# Patient Record
Sex: MALE | Race: WHITE | NOT HISPANIC OR LATINO | Employment: FULL TIME | URBAN - METROPOLITAN AREA
[De-identification: names, ages, dates, MRNs, and addresses within clinical notes are randomized per-mention and may not be internally consistent; named-entity substitution may affect disease eponyms.]

---

## 2023-04-22 LAB
ALANINE AMINOTRANSFERASE (SGPT) (U/L) IN SER/PLAS: 19 U/L (ref 10–52)
ALBUMIN (G/DL) IN SER/PLAS: 4.2 G/DL (ref 3.4–5)
ALKALINE PHOSPHATASE (U/L) IN SER/PLAS: 66 U/L (ref 33–136)
ANION GAP IN SER/PLAS: 14 MMOL/L (ref 10–20)
ASPARTATE AMINOTRANSFERASE (SGOT) (U/L) IN SER/PLAS: 16 U/L (ref 9–39)
BILIRUBIN TOTAL (MG/DL) IN SER/PLAS: 0.7 MG/DL (ref 0–1.2)
CALCIUM (MG/DL) IN SER/PLAS: 9 MG/DL (ref 8.6–10.3)
CARBON DIOXIDE, TOTAL (MMOL/L) IN SER/PLAS: 29 MMOL/L (ref 21–32)
CHLORIDE (MMOL/L) IN SER/PLAS: 102 MMOL/L (ref 98–107)
CHOLESTEROL (MG/DL) IN SER/PLAS: 227 MG/DL (ref 0–199)
CHOLESTEROL IN HDL (MG/DL) IN SER/PLAS: 46.5 MG/DL
CHOLESTEROL/HDL RATIO: 4.9
CREATININE (MG/DL) IN SER/PLAS: 1.14 MG/DL (ref 0.5–1.3)
ERYTHROCYTE DISTRIBUTION WIDTH (RATIO) BY AUTOMATED COUNT: 13 % (ref 11.5–14.5)
ERYTHROCYTE MEAN CORPUSCULAR HEMOGLOBIN CONCENTRATION (G/DL) BY AUTOMATED: 33.4 G/DL (ref 32–36)
ERYTHROCYTE MEAN CORPUSCULAR VOLUME (FL) BY AUTOMATED COUNT: 93 FL (ref 80–100)
ERYTHROCYTES (10*6/UL) IN BLOOD BY AUTOMATED COUNT: 5.11 X10E12/L (ref 4.5–5.9)
ESTIMATED AVERAGE GLUCOSE FOR HBA1C: 120 MG/DL
GFR MALE: 71 ML/MIN/1.73M2
GLUCOSE (MG/DL) IN SER/PLAS: 89 MG/DL (ref 74–99)
HEMATOCRIT (%) IN BLOOD BY AUTOMATED COUNT: 47.3 % (ref 41–52)
HEMOGLOBIN (G/DL) IN BLOOD: 15.8 G/DL (ref 13.5–17.5)
HEMOGLOBIN A1C/HEMOGLOBIN TOTAL IN BLOOD: 5.8 %
LDL: 147 MG/DL (ref 0–99)
LEUKOCYTES (10*3/UL) IN BLOOD BY AUTOMATED COUNT: 7.6 X10E9/L (ref 4.4–11.3)
PLATELETS (10*3/UL) IN BLOOD AUTOMATED COUNT: 174 X10E9/L (ref 150–450)
POTASSIUM (MMOL/L) IN SER/PLAS: 3.5 MMOL/L (ref 3.5–5.3)
PROTEIN TOTAL: 7.4 G/DL (ref 6.4–8.2)
SODIUM (MMOL/L) IN SER/PLAS: 141 MMOL/L (ref 136–145)
THYROTROPIN (MIU/L) IN SER/PLAS BY DETECTION LIMIT <= 0.05 MIU/L: 1.71 MIU/L (ref 0.44–3.98)
TRIGLYCERIDE (MG/DL) IN SER/PLAS: 166 MG/DL (ref 0–149)
UREA NITROGEN (MG/DL) IN SER/PLAS: 27 MG/DL (ref 6–23)
VLDL: 33 MG/DL (ref 0–40)

## 2023-04-23 LAB — PROSTATE SPECIFIC AG (NG/ML) IN SER/PLAS: 4.64 NG/ML (ref 0–4)

## 2024-02-05 ENCOUNTER — TELEPHONE (OUTPATIENT)
Dept: OTOLARYNGOLOGY | Facility: CLINIC | Age: 69
End: 2024-02-05
Payer: MEDICARE

## 2024-02-05 DIAGNOSIS — J32.9 CHRONIC SINUSITIS, UNSPECIFIED LOCATION: Primary | ICD-10-CM

## 2024-02-05 NOTE — TELEPHONE ENCOUNTER
Pt last seen in 4/2023.  He said his polyps are acting up and would like steroids called in.  He is in [place] .

## 2024-02-06 RX ORDER — PREDNISONE 10 MG/1
TABLET ORAL
Qty: 39 TABLET | Refills: 0 | Status: SHIPPED | OUTPATIENT
Start: 2024-02-06 | End: 2024-02-18

## 2024-03-23 DIAGNOSIS — I10 HYPERTENSION, UNSPECIFIED TYPE: Primary | ICD-10-CM

## 2024-03-25 RX ORDER — OLMESARTAN MEDOXOMIL, AMLODIPINE AND HYDROCHLOROTHIAZIDE TABLET 40/10/25 MG 40; 10; 25 MG/1; MG/1; MG/1
1 TABLET ORAL DAILY
Qty: 90 TABLET | Refills: 1 | Status: SHIPPED | OUTPATIENT
Start: 2024-03-25

## 2024-04-22 ENCOUNTER — OFFICE VISIT (OUTPATIENT)
Dept: OTOLARYNGOLOGY | Facility: CLINIC | Age: 69
End: 2024-04-22
Payer: MEDICARE

## 2024-04-22 VITALS — BODY MASS INDEX: 29.86 KG/M2 | WEIGHT: 197 LBS | HEIGHT: 68 IN

## 2024-04-22 DIAGNOSIS — J33.9 NASAL POLYPS: ICD-10-CM

## 2024-04-22 DIAGNOSIS — J32.9 CHRONIC SINUSITIS, UNSPECIFIED LOCATION: Primary | ICD-10-CM

## 2024-04-22 DIAGNOSIS — B99.9 INFECTION: ICD-10-CM

## 2024-04-22 PROCEDURE — 99214 OFFICE O/P EST MOD 30 MIN: CPT | Performed by: OTOLARYNGOLOGY

## 2024-04-22 PROCEDURE — 1160F RVW MEDS BY RX/DR IN RCRD: CPT | Performed by: OTOLARYNGOLOGY

## 2024-04-22 PROCEDURE — 1159F MED LIST DOCD IN RCRD: CPT | Performed by: OTOLARYNGOLOGY

## 2024-04-22 PROCEDURE — 1036F TOBACCO NON-USER: CPT | Performed by: OTOLARYNGOLOGY

## 2024-04-22 RX ORDER — BUDESONIDE AND FORMOTEROL FUMARATE DIHYDRATE 160; 4.5 UG/1; UG/1
2 AEROSOL RESPIRATORY (INHALATION)
COMMUNITY

## 2024-04-22 RX ORDER — PREDNISONE 5 MG/1
TABLET ORAL
Qty: 21 TABLET | Refills: 1 | Status: SHIPPED | OUTPATIENT
Start: 2024-04-22

## 2024-04-22 RX ORDER — FLUTICASONE PROPIONATE 50 MCG
1 SPRAY, SUSPENSION (ML) NASAL DAILY
COMMUNITY

## 2024-04-22 NOTE — PROGRESS NOTES
"HPI  Lemuel Aleman is a 69 y.o. male has required 1 course of steroids in the last year since last seen.  Using nasal steroid spray regularly.  Feeling well.  Has some congestion right side when breathing out but not when breathing in.       Prior history: 66-year-old male with history of bilateral endoscopic sinus surgery in mid 2019 for significant nasal polyposis. He has been using topical nasal steroids since that time. He has developed increasing left greater than right nasal obstruction and took 3 days of prednisone few weeks ago with some improvement and then discontinued this course. He has been reasonably stably breathing through his nose bilaterally since then but presents for follow-up to see what more can or should be done. He has not required antibiotics. He does not complain of vision changes or pain       Past Medical History:   Diagnosis Date    Personal history of other diseases of the circulatory system     History of hypertension    Personal history of other diseases of the respiratory system     History of nasal polyp            Medications:     Current Outpatient Medications:     budesonide-formoteroL (Symbicort) 160-4.5 mcg/actuation inhaler, Inhale 2 puffs 2 times a day. Rinse mouth with water after use to reduce aftertaste and incidence of candidiasis. Do not swallow., Disp: , Rfl:     fluticasone (Flonase) 50 mcg/actuation nasal spray, Administer 1 spray into each nostril once daily. Shake gently. Before first use, prime pump. After use, clean tip and replace cap., Disp: , Rfl:     olmesartan-amLODIPin-hcthiazid 40-10-25 mg tablet, TAKE 1 TABLET BY MOUTH EVERY DAY, Disp: 90 tablet, Rfl: 1     Allergies:  Allergies   Allergen Reactions    Advil [Ibuprofen] Other        Physical Exam:  Last Recorded Vitals  Height 1.727 m (5' 8\"), weight 89.4 kg (197 lb).  General:     General appearance: Well-developed, well-nourished in no acute distress.       Voice:  normal       Head/face: Normal " appearance; nontender to palpation     Facial nerve function: Normal and symmetric bilaterally.    Oral/oropharynx:     Oral vestibule: Normal labial and gingival mucosa     Tongue/floor of mouth: Normal without lesion     Oropharynx: Clear.  No lesions present of the hard/soft palate, posterior pharynx    Neck:     Neck: Normal appearance, trachea midline     Salivary glands: Normal to palpation bilaterally     Lymph nodes: No cervical lymphadenopathy to palpation     Thyroid: No thyromegaly.  No palpable nodules     Range of motion: Normal    Neurological:     Cortical functions: Alert and oriented x3, appropriate affect       Larynx/hypopharynx:     Laryngeal findings: Mirror exam inadequate or limited secondary to enlarged base of tongue and/or excessive gagging    Ear:     Ear canal: Normal bilaterally     Tympanic membrane: Intact and mobile bilaterally     Pinna: Normal bilaterally     Hearing:  Gross hearing assessment normal by voice    Nose:     Visualized using: Anterior rhinoscopy     Nasopharynx: Inadequate mirror exam secondary to gag, anatomy.       Nasal dorsum: Nontraumatic midline appearance     Septum: Midline     Inferior turbinates: Normally sized     Mucosa: Polyps to the inferior turbinate on the right and to the middle turbinate on the left more posteriorly      ASSESSMENT/PLAN:  Given his appearance and symptoms, recommend a short prednisone taper today and he has 1 refill to use as needed.  He has been happy with intermittent treatment which has not been more than a couple times a year.  Continue Flonase.  Recheck in 1 year, sooner as needed        Mehran Sorensen MD

## 2024-05-03 ENCOUNTER — HOSPITAL ENCOUNTER (EMERGENCY)
Facility: HOSPITAL | Age: 69
Discharge: HOME | End: 2024-05-03
Payer: MEDICARE

## 2024-05-03 ENCOUNTER — APPOINTMENT (OUTPATIENT)
Dept: RADIOLOGY | Facility: HOSPITAL | Age: 69
End: 2024-05-03
Payer: MEDICARE

## 2024-05-03 VITALS
BODY MASS INDEX: 28.88 KG/M2 | DIASTOLIC BLOOD PRESSURE: 81 MMHG | HEIGHT: 69 IN | SYSTOLIC BLOOD PRESSURE: 131 MMHG | OXYGEN SATURATION: 99 % | TEMPERATURE: 98.2 F | RESPIRATION RATE: 15 BRPM | HEART RATE: 87 BPM | WEIGHT: 195 LBS

## 2024-05-03 DIAGNOSIS — M79.604 LEG PAIN, POSTERIOR, RIGHT: Primary | ICD-10-CM

## 2024-05-03 PROCEDURE — 99284 EMERGENCY DEPT VISIT MOD MDM: CPT | Mod: 25

## 2024-05-03 PROCEDURE — 93971 EXTREMITY STUDY: CPT

## 2024-05-03 PROCEDURE — 93971 EXTREMITY STUDY: CPT | Performed by: RADIOLOGY

## 2024-05-03 ASSESSMENT — PAIN DESCRIPTION - ORIENTATION: ORIENTATION: RIGHT

## 2024-05-03 ASSESSMENT — PAIN DESCRIPTION - LOCATION: LOCATION: LEG

## 2024-05-03 ASSESSMENT — PAIN SCALES - GENERAL: PAINLEVEL_OUTOF10: 4

## 2024-05-03 ASSESSMENT — PAIN - FUNCTIONAL ASSESSMENT: PAIN_FUNCTIONAL_ASSESSMENT: 0-10

## 2024-05-03 ASSESSMENT — PAIN DESCRIPTION - FREQUENCY: FREQUENCY: INTERMITTENT

## 2024-05-03 ASSESSMENT — COLUMBIA-SUICIDE SEVERITY RATING SCALE - C-SSRS
6. HAVE YOU EVER DONE ANYTHING, STARTED TO DO ANYTHING, OR PREPARED TO DO ANYTHING TO END YOUR LIFE?: NO
2. HAVE YOU ACTUALLY HAD ANY THOUGHTS OF KILLING YOURSELF?: NO
1. IN THE PAST MONTH, HAVE YOU WISHED YOU WERE DEAD OR WISHED YOU COULD GO TO SLEEP AND NOT WAKE UP?: NO

## 2024-05-03 NOTE — ED TRIAGE NOTES
Patient reports right lower leg pain for 1 week. No swelling or bruising observed. Patient reports increasing pain when ambulating. Hx hypertension, asthma. Denies sob, chest pain, n/v

## 2024-05-03 NOTE — ED PROVIDER NOTES
HPI     CC: Leg Pain     HPI: Lemuel Aleman is a 69 y.o. male with past medical history of hypertension and asthma presents with concern for right calf pain.  Patient reports that about 1 week ago, he developed some right calf pain and thought it was from working in his garage.  He otherwise states that he works a sedentary lifestyle where he sits at a desk throughout the day.  He did not think much of it except that the calf pain has continued.  He does have pain with ambulation that improves with some rest.  States that he is limping and he has never done this before.  He denies history of injury to this leg.  Denies fever, warmth, redness, or severe pain.  He did recently travel to [facility] about 3 weeks ago via flight.  He has never had a DVT, complains of no chest pain or shortness of breath.  No numbness or tingling to the leg.    ROS: 10-point review of systems was performed and is otherwise negative except as noted in HPI.      Past Medical History: Noncontributory except per HPI     Past Surgical History: Noncontributory except per HPI     Family History: Reviewed and noncontributory     Social History: Non-smoker      Allergies   Allergen Reactions    Advil [Ibuprofen] Other       Home Meds:   Current Outpatient Medications   Medication Instructions    budesonide-formoteroL (Symbicort) 160-4.5 mcg/actuation inhaler 2 puffs, inhalation, 2 times daily RT, Rinse mouth with water after use to reduce aftertaste and incidence of candidiasis. Do not swallow.    fluticasone (Flonase) 50 mcg/actuation nasal spray 1 spray, Each Nostril, Daily, Shake gently. Before first use, prime pump. After use, clean tip and replace cap.    olmesartan-amLODIPin-hcthiazid 40-10-25 mg tablet 1 tablet, oral, Daily    predniSONE (Deltasone) 5 mg tablet Take 6 tabs PO daily x 1 day, then 5 tabs PO daily x 1 day, then 4 tabs PO daily x 1 day, then 3 tabs PO daily x 1 day, then 2 tabs PO daily x 1 day, then 1 tab PO daily x 1 day     "    ED Triage Vitals [05/03/24 1453]   Temperature Heart Rate Respirations BP   36.8 °C (98.2 °F) (!) 101 18 131/81      Pulse Ox Temp src Heart Rate Source Patient Position   100 % -- -- --      BP Location FiO2 (%)     -- --         Heart Rate:  []   Temperature:  [36.8 °C (98.2 °F)]   Respirations:  [15-18]   BP: (131)/(81)   Height:  [175.3 cm (5' 9\")]   Weight:  [88.5 kg (195 lb)]   Pulse Ox:  [99 %-100 %]      Physical Exam:  Physical Exam  Vitals and nursing note reviewed.   Constitutional:       General: He is not in acute distress.     Appearance: Normal appearance. He is not ill-appearing.   HENT:      Head: Normocephalic and atraumatic.      Right Ear: External ear normal.      Left Ear: External ear normal.      Nose: Nose normal.      Mouth/Throat:      Mouth: Mucous membranes are moist.   Eyes:      Extraocular Movements: Extraocular movements intact.      Conjunctiva/sclera: Conjunctivae normal.      Pupils: Pupils are equal, round, and reactive to light.   Cardiovascular:      Rate and Rhythm: Normal rate and regular rhythm.      Pulses: Normal pulses.   Pulmonary:      Effort: Pulmonary effort is normal. No respiratory distress.      Breath sounds: Normal breath sounds.   Abdominal:      General: Abdomen is flat.      Palpations: Abdomen is soft.      Tenderness: There is no abdominal tenderness.   Musculoskeletal:         General: No swelling, tenderness, deformity or signs of injury. Normal range of motion.      Cervical back: Normal range of motion and neck supple.      Right lower leg: No edema.      Left lower leg: No edema.      Comments: No pain or swelling to the right calf.  No dependent or pitting edema.  No color skin changes.  Compartments soft and compressible.  Range of motion normal.  2+ DP pulse.  Sensation and strength normal.  Pain only occurs with ambulation.   Skin:     General: Skin is warm and dry.      Capillary Refill: Capillary refill takes less than 2 seconds. "   Neurological:      General: No focal deficit present.      Mental Status: He is alert and oriented to person, place, and time.   Psychiatric:         Mood and Affect: Mood normal.          Diagnostic Results        Labs Reviewed - No data to display      Lower extremity venous duplex right   Final Result   Normal right leg Doppler ultrasound.        Signed by: Hollis Castaneda 5/3/2024 3:53 PM   Dictation workstation:   [id number]                    No data recorded                Procedure  Procedures    ED Course & MDM   Assessment/Plan:     Medications - No data to display     Diagnoses as of 05/03/24 1637   Leg pain, posterior, right       Medical Decision Making    Lemuel Aleman is a 69 y.o. male with past medical history of hypertension and asthma presents with concern for right calf pain.  Patient is nontoxic-appearing and vital signs are notable for mild tachycardia at a rate of 101.  Based on his presentation and symptoms, differential diagnosis includes right Baker's cyst, right-sided DVT, or right-sided gastrocnemius strain.  Will obtain duplex ultrasound of the right lower extremity.  This was negative for acute DVT.  After further discussion with the patient, his pain is mostly localized to plantar and dorsiflexion only with ambulation.  It is possible that he strained to the proximal portion of his Achilles or the gastrocnemius muscle of the calf.  For conservative treatment, he was placed in a walking boot by nursing to avoid plantar and dorsi flexion with ambulation.  We discussed that if this does not improve his symptoms along with Tylenol and Motrin, he should see orthopedics.  This was ordered for him.  If this continues to not improve or he develops new symptoms, would also recommend following up with his primary care physician for possible small arterial occlusions that could be causing this claudication.  Feel that since he did have a mechanism of injury and has low risk factors, this  is most likely to be a muscle strain.  Patient is in agreement with this plan.  We did discuss that if he develops new or worsening pain, swelling, redness, warmth, fevers, chills, shortness of breath, or chest pain, he should return for reevaluation.  Patient agreeable to plan of care and felt comfortable returning home.  Heart rate improved to 87.    Disposition: Home    ED Prescriptions    None         Social Determinants Affecting Care: none     Mari Dumont PA-C    This note was dictated by speech recognition. Minor errors in transcription may be present.     Mari Dumont PA-C  05/03/24 6024

## 2024-08-28 DIAGNOSIS — I10 HYPERTENSION, UNSPECIFIED TYPE: ICD-10-CM

## 2024-08-28 RX ORDER — OLMESARTAN MEDOXOMIL, AMLODIPINE AND HYDROCHLOROTHIAZIDE TABLET 40/10/25 MG 40; 10; 25 MG/1; MG/1; MG/1
1 TABLET ORAL DAILY
Qty: 90 TABLET | Refills: 0 | Status: SHIPPED | OUTPATIENT
Start: 2024-08-28

## 2024-10-11 DIAGNOSIS — J32.9 CHRONIC SINUSITIS, UNSPECIFIED LOCATION: ICD-10-CM

## 2024-10-11 DIAGNOSIS — J33.9 NASAL POLYPS: Primary | ICD-10-CM

## 2024-10-11 NOTE — TELEPHONE ENCOUNTER
LOV 4/2024 for nasal polyps and chronic sinus. Called to request rx for prednisone - he is not asking for a taper though - more of a maintenance that he can take every few days. I told I would run it past you, but not something you do routinely. He would also like a refill on fluticasone, he admits to not using as regularly as he should and I did try to encourage daily use. He does sound nasally, has some congestion. Please advise.     Darell said he does not need a c/b, [facility] sends a notification. I did tell him we would probably have to discuss the prednisone with him.

## 2024-10-14 RX ORDER — PREDNISONE 10 MG/1
TABLET ORAL
Qty: 39 TABLET | Refills: 0 | Status: SHIPPED | OUTPATIENT
Start: 2024-10-14 | End: 2024-10-26

## 2024-10-15 RX ORDER — FLUTICASONE PROPIONATE 50 MCG
2 SPRAY, SUSPENSION (ML) NASAL DAILY
Qty: 48 G | Refills: 3 | Status: SHIPPED | OUTPATIENT
Start: 2024-10-15 | End: 2025-10-15

## 2024-10-15 NOTE — TELEPHONE ENCOUNTER
Patient notified of taper dose. [place] I asked he call ASAP for an appt. He asked for a refill of fluticasone as well - 90 day supplies.

## 2024-11-05 ENCOUNTER — APPOINTMENT (OUTPATIENT)
Dept: PRIMARY CARE | Facility: CLINIC | Age: 69
End: 2024-11-05
Payer: MEDICARE

## 2024-11-07 ENCOUNTER — APPOINTMENT (OUTPATIENT)
Dept: PRIMARY CARE | Facility: CLINIC | Age: 69
End: 2024-11-07
Payer: MEDICARE

## 2024-11-07 VITALS
BODY MASS INDEX: 30.61 KG/M2 | HEART RATE: 115 BPM | SYSTOLIC BLOOD PRESSURE: 128 MMHG | OXYGEN SATURATION: 97 % | HEIGHT: 67 IN | DIASTOLIC BLOOD PRESSURE: 88 MMHG | WEIGHT: 195 LBS

## 2024-11-07 DIAGNOSIS — E78.2 MIXED HYPERLIPIDEMIA: ICD-10-CM

## 2024-11-07 DIAGNOSIS — R73.03 PREDIABETES: ICD-10-CM

## 2024-11-07 DIAGNOSIS — R97.20 ELEVATED PSA: ICD-10-CM

## 2024-11-07 DIAGNOSIS — Z00.00 ENCOUNTER FOR ANNUAL WELLNESS EXAM IN MEDICARE PATIENT: Primary | ICD-10-CM

## 2024-11-07 DIAGNOSIS — Z12.11 COLON CANCER SCREENING: ICD-10-CM

## 2024-11-07 DIAGNOSIS — Z12.5 PROSTATE CANCER SCREENING: ICD-10-CM

## 2024-11-07 DIAGNOSIS — I10 HYPERTENSION, UNSPECIFIED TYPE: ICD-10-CM

## 2024-11-07 PROCEDURE — 1036F TOBACCO NON-USER: CPT | Performed by: FAMILY MEDICINE

## 2024-11-07 PROCEDURE — 1170F FXNL STATUS ASSESSED: CPT | Performed by: FAMILY MEDICINE

## 2024-11-07 PROCEDURE — 99213 OFFICE O/P EST LOW 20 MIN: CPT | Performed by: FAMILY MEDICINE

## 2024-11-07 PROCEDURE — 1159F MED LIST DOCD IN RCRD: CPT | Performed by: FAMILY MEDICINE

## 2024-11-07 PROCEDURE — G0439 PPPS, SUBSEQ VISIT: HCPCS | Performed by: FAMILY MEDICINE

## 2024-11-07 PROCEDURE — 3074F SYST BP LT 130 MM HG: CPT | Performed by: FAMILY MEDICINE

## 2024-11-07 PROCEDURE — 3079F DIAST BP 80-89 MM HG: CPT | Performed by: FAMILY MEDICINE

## 2024-11-07 PROCEDURE — 3008F BODY MASS INDEX DOCD: CPT | Performed by: FAMILY MEDICINE

## 2024-11-07 RX ORDER — OLMESARTAN MEDOXOMIL, AMLODIPINE AND HYDROCHLOROTHIAZIDE TABLET 40/10/25 MG 40; 10; 25 MG/1; MG/1; MG/1
1 TABLET ORAL DAILY
Qty: 90 TABLET | Refills: 0 | Status: SHIPPED | OUTPATIENT
Start: 2024-11-07

## 2024-11-07 ASSESSMENT — PATIENT HEALTH QUESTIONNAIRE - PHQ9
2. FEELING DOWN, DEPRESSED OR HOPELESS: NOT AT ALL
1. LITTLE INTEREST OR PLEASURE IN DOING THINGS: NOT AT ALL
SUM OF ALL RESPONSES TO PHQ9 QUESTIONS 1 AND 2: 0

## 2024-11-07 ASSESSMENT — ACTIVITIES OF DAILY LIVING (ADL)
DOING_HOUSEWORK: INDEPENDENT
BATHING: INDEPENDENT
TAKING_MEDICATION: INDEPENDENT
DRESSING: INDEPENDENT
MANAGING_FINANCES: INDEPENDENT
GROCERY_SHOPPING: INDEPENDENT

## 2024-11-07 ASSESSMENT — ENCOUNTER SYMPTOMS
OCCASIONAL FEELINGS OF UNSTEADINESS: 0
LOSS OF SENSATION IN FEET: 0
DEPRESSION: 0

## 2024-11-07 NOTE — PROGRESS NOTES
Subjective   Patient ID: Lemuel Aleman is a 69 y.o. male who presents for No chief complaint on file..  Today he is accompanied by alone.     HPI Medicare Wellness  Overall patient is doing well.   Immunization:   -Tdap 2017    -Shingrix UTD    -PCV will get at pharmacy   -COVID-19 vaccine Pfizer Moderna UTD  Colon Cancer Screening: No family history, will get Cologuard  Diet: Well Balanced  Exercise: Active  Tobacco: Denies use  EtOH: Rarely Socially     Other concerns discussed at today's visit:    Hypertension  Patient currently on a combination pill of olmesartan amlodipine hydrochlorothiazide 40-10-25 mg once daily.  Tolerating well  Denies any headaches or cardiopulmonary symptoms    Hyperlipidemia  Not currently on any medications  Patient's last lipid panel checked in April 2023 with cholesterol at 227 and LDL at 147  CT cardiac score done in 2023 with a score of 0  Agrees for repeat lipid panel.    Prediabetes  Patient's last A1c at 5.8%  Managing with diet and exercise  Patient denies any increased thirst, urination, or weight loss.    Elevated PSA  Patient does have a history of elevated PSA, 3 years ago PSA was at 7.6 for which he did see urology.  Did improve to 4.1 the year after.  Most recent reading at 4.64.   Has not seen urology in over 2 years.  We will continue to monitor    Current Outpatient Medications on File Prior to Visit   Medication Sig Dispense Refill    budesonide-formoteroL (Symbicort) 160-4.5 mcg/actuation inhaler Inhale 2 puffs 2 times a day. Rinse mouth with water after use to reduce aftertaste and incidence of candidiasis. Do not swallow.      fluticasone (Flonase) 50 mcg/actuation nasal spray Administer 1 spray into each nostril once daily. Shake gently. Before first use, prime pump. After use, clean tip and replace cap.      [DISCONTINUED] olmesartan-amLODIPin-hcthiazid 40-10-25 mg tablet TAKE 1 TABLET BY MOUTH EVERY DAY 90 tablet 0    fluticasone (Flonase) 50  "mcg/actuation nasal spray Administer 2 sprays into each nostril once daily. Shake gently. Before first use, prime pump. After use, clean tip and replace cap. (Patient not taking: Reported on 11/7/2024) 48 g 3     No current facility-administered medications on file prior to visit.        Allergies   Allergen Reactions    Advil [Ibuprofen] Other       Immunization History   Administered Date(s) Administered    Moderna COVID-19 vaccine, bivalent, blue cap/gray label *Check age/dose* 07/26/2023    Pfizer COVID-19 vaccine, bivalent, age 12 years and older (30 mcg/0.3 mL) 09/22/2022    Pfizer Gray Cap SARS-CoV-2 04/18/2022    Pfizer Purple Cap SARS-CoV-2 03/04/2021, 03/25/2021, 11/11/2021         Review of Systems  All pertinent positive symptoms are included in the history of present illness.  All other systems have been reviewed and are negative and noncontributory to this patient's current ailments.     Objective   /88   Pulse (!) 115   Ht 1.702 m (5' 7\")   Wt 88.5 kg (195 lb)   SpO2 97%   BMI 30.54 kg/m²   BSA: 2.05 meters squared  No visits with results within 1 Month(s) from this visit.   Latest known visit with results is:   Orders Only on 04/22/2023   Component Date Value Ref Range Status    TSH 04/22/2023 1.71  0.44 - 3.98 mIU/L Final    Comment:  TSH testing is performed using different testing    methodology at [facility] than at other    [facility] hospitals. Direct result comparisons should    only be made within the same method.         Physical Exam  CONSTITUTIONAL - well nourished, well developed, looks like stated age, in no acute distress, not ill-appearing, and not tired appearing  SKIN - normal skin color and pigmentation, normal skin turgor without rash, lesions, or nodules visualized  HEAD - no trauma, normocephalic  EYES - pupils are equal and reactive to light, extraocular muscles are intact, and normal external exam  ENT - no injection, no signs of infection, uvula midline, " normal tongue movement and throat normal, no exudate, nasal passage without discharge and patent  NECK - supple without rigidity, no neck mass was observed,   LUNG - clear to auscultation, no wheezing, no crackles and no rales, good effort  CARDIAC - regular rate and regular rhythm, no skipped beats, no murmur  ABDOMEN - no organomegaly, soft, nontender, nondistended, normal bowel sounds  EXTREMITIES - no edema, no deformities  NEUROLOGICAL - normal gait, normal balance, normal motor, alert, oriented and no focal signs  PSYCHIATRIC - alert, pleasant and cordial, age-appropriate      Assessment/Plan   Diagnoses and all orders for this visit:  Encounter for annual wellness exam in Medicare patient  Overall patient is doing well.    Complete history and physical examination was performed   Lab work was ordered and will notify of test results and make recommendations accordingly  Colon Cancer screening due, Cologuard ordered  Please attempt to eat a well-balanced diet and exercise regularly  -     Lipid Panel; Future  -     Comprehensive Metabolic Panel; Future  -     Prostate Specific Antigen, Screen; Future  Hypertension, unspecified type  A refill for the blood pressure medication has been sent to pharmacy.  Goal blood pressure is <130/80, ideally 120/80. I recommend monitoring your blood pressure at home so you can bring these readings to your next visit.   I recommend a low salt, well balanced diet, free from processed foods and regular physical activity in order to get your blood pressure down naturally.  -     olmesartan-amLODIPin-hcthiazid 40-10-25 mg tablet; Take 1 tablet by mouth once daily.  -     Comprehensive Metabolic Panel; Future  Mixed hyperlipidemia:   The 10-year ASCVD risk score (Maribel WILLS, et al., 2019) is: 19%    Values used to calculate the score:      Age: 69 years      Sex: Male      Is Non- : No      Diabetic: No      Tobacco smoker: No      Systolic Blood Pressure: 128  mmHg      Is BP treated: Yes      HDL Cholesterol: 46.5 mg/dL      Total Cholesterol: 227 mg/dL   Cardiovascular risk discussed and, if needed, lifestyle modifications recommended, including nutritional choices, exercise, and elimination of habits contributing to risk.    We agreed on a plan to reduce the current cardiovascular risk  Recommend reducing intake of saturated fat and trans fat, such as red meat and dairy products made with whole milk. Recommend limiting fried foods and cooking with healthy oils, such as vegetable oil. Choose skim milk, low-fat or fat-free dairy products instead. Emphasized increasing fruits, vegetables, whole grains, poultry, fish, nuts and nontropical vegetable oils in diet.  Recheck CMP in one month and lipid panel in three months.  -     Lipid Panel; Future  Prediabetes  Your hemoglobin A1c was elevated in the pre-diabetes range. It is going to be important going forward to eat a well-balanced diet, avoiding processed carbohydrates and sugar as well as get regular exercise in order to keep that from progressing.  -     Comprehensive Metabolic Panel; Future  -     Hemoglobin A1c; Future  Prostate cancer screening  Elevated PSA  We will recheck a PSA.  Discussed at today's visit that if PSA continues to be elevated we recommend that you follow-up with urology once again.  We will place a referral at that time.  -     Prostate Specific Antigen, Screen; Future  Colon cancer screening  Please get Cologuard done at your earliest convenience we will notify results and treatment for conditions accordingly.  -     Cologuard® colon cancer screening; Future

## 2024-11-08 ENCOUNTER — LAB (OUTPATIENT)
Dept: LAB | Facility: LAB | Age: 69
End: 2024-11-08
Payer: MEDICARE

## 2024-11-08 DIAGNOSIS — I10 HYPERTENSION, UNSPECIFIED TYPE: ICD-10-CM

## 2024-11-08 DIAGNOSIS — Z12.5 PROSTATE CANCER SCREENING: ICD-10-CM

## 2024-11-08 DIAGNOSIS — Z00.00 ENCOUNTER FOR ANNUAL WELLNESS EXAM IN MEDICARE PATIENT: ICD-10-CM

## 2024-11-08 DIAGNOSIS — E78.2 MIXED HYPERLIPIDEMIA: ICD-10-CM

## 2024-11-08 DIAGNOSIS — R97.20 ELEVATED PSA: ICD-10-CM

## 2024-11-08 DIAGNOSIS — R73.03 PREDIABETES: ICD-10-CM

## 2024-11-08 LAB
ALBUMIN SERPL BCP-MCNC: 4.7 G/DL (ref 3.4–5)
ALP SERPL-CCNC: 81 U/L (ref 33–136)
ALT SERPL W P-5'-P-CCNC: 22 U/L (ref 10–52)
ANION GAP SERPL CALC-SCNC: 13 MMOL/L (ref 10–20)
AST SERPL W P-5'-P-CCNC: 16 U/L (ref 9–39)
BILIRUB SERPL-MCNC: 0.6 MG/DL (ref 0–1.2)
BUN SERPL-MCNC: 37 MG/DL (ref 6–23)
CALCIUM SERPL-MCNC: 9.6 MG/DL (ref 8.6–10.3)
CHLORIDE SERPL-SCNC: 103 MMOL/L (ref 98–107)
CHOLEST SERPL-MCNC: 245 MG/DL (ref 0–199)
CHOLESTEROL/HDL RATIO: 4.4
CO2 SERPL-SCNC: 29 MMOL/L (ref 21–32)
CREAT SERPL-MCNC: 1.36 MG/DL (ref 0.5–1.3)
EGFRCR SERPLBLD CKD-EPI 2021: 57 ML/MIN/1.73M*2
EST. AVERAGE GLUCOSE BLD GHB EST-MCNC: 114 MG/DL
GLUCOSE SERPL-MCNC: 92 MG/DL (ref 74–99)
HBA1C MFR BLD: 5.6 %
HDLC SERPL-MCNC: 55.4 MG/DL
LDLC SERPL CALC-MCNC: 165 MG/DL
NON HDL CHOLESTEROL: 190 MG/DL (ref 0–149)
POTASSIUM SERPL-SCNC: 3.5 MMOL/L (ref 3.5–5.3)
PROT SERPL-MCNC: 7.2 G/DL (ref 6.4–8.2)
PSA SERPL-MCNC: 4.05 NG/ML
SODIUM SERPL-SCNC: 141 MMOL/L (ref 136–145)
TRIGL SERPL-MCNC: 121 MG/DL (ref 0–149)
VLDL: 24 MG/DL (ref 0–40)

## 2024-11-08 PROCEDURE — 80061 LIPID PANEL: CPT

## 2024-11-08 PROCEDURE — G0103 PSA SCREENING: HCPCS

## 2024-11-08 PROCEDURE — 83036 HEMOGLOBIN GLYCOSYLATED A1C: CPT

## 2024-11-08 PROCEDURE — 80053 COMPREHEN METABOLIC PANEL: CPT

## 2024-11-08 PROCEDURE — 36415 COLL VENOUS BLD VENIPUNCTURE: CPT

## 2024-11-08 NOTE — PROGRESS NOTES
Clemente Meraz        RE: 6-14  Dr Dow  Received: Today       Ladi Avalos                   Oh I didn't know that. Then it would be 1200 procedure arrive 1100. Please send new case. Also disregard my message to call pt to move up for Friday.            Previous Messages       ----- Message -----      From: Iwona Avalos      Sent: 6/14/2017  12:40 PM        To: Ladi Lewis   Subject: RE: 6-14  Dr Dow                                 He's being rescheduled for Friday 6/16. I keep getting his voicemail   ----- Message -----      From: Ladi Lewis      Sent: 6/14/2017  12:26 PM        To: Iwona Avalos   Subject: RE: 6-14  Dr Dow                                 no, for [facility] you don't give times, you can put in case needs if pt needs a certain time and I will try to give that time.   ----- Message -----      From: Iwona Avalos      Sent: 6/14/2017  12:24 PM        To: Ladi Lewis   Subject: RE: 6-14  Dr Dow                                 Ok. Should I give him an arrival time?   ----- Message -----      From: Ladi Lewis      Sent: 6/14/2017  11:22 AM        To: [facility] Sched Pool   Subject: 6-14  Dr Victor M Allison,             This pt was cancelled in pre-op today, due to high blood pressure. When he reschedules I will need a new case encounter.   Thanks             I reviewed and examined the patient. I was present for the key exam elements, and I fully participated in the patient's care. I discussed the management of the care with the resident. I have personally reviewed the pertinent labs and imaging, as well as recent notes, with the patient. I have reviewed the note above and agree with the resident's medical decision making as documented in the resident's note, in addition to the following comments / findings:     Agree with the rest of the plan outlined below by resident physician. No red flags.      The patient understands and agrees to the assessment and plan of care. Patient has also agreed to follow up and comply with the treatment and evaluation as recommended today. Patient was instructed to call the office at [phone] should questions arise regarding their treatment or care.     Hiram Medrano DO, FAOASM  Family Medicine   [address], [address]  [place] [address]     Hiram Medrano DO

## 2024-11-11 DIAGNOSIS — E78.5 HYPERLIPIDEMIA, UNSPECIFIED HYPERLIPIDEMIA TYPE: Primary | ICD-10-CM

## 2024-11-11 RX ORDER — ROSUVASTATIN CALCIUM 10 MG/1
10 TABLET, COATED ORAL DAILY
Qty: 100 TABLET | Refills: 3 | Status: SHIPPED | OUTPATIENT
Start: 2024-11-11 | End: 2025-12-16

## 2024-11-20 LAB — NONINV COLON CA DNA+OCC BLD SCRN STL QL: NEGATIVE

## 2025-02-04 ENCOUNTER — TELEPHONE (OUTPATIENT)
Dept: PRIMARY CARE | Facility: CLINIC | Age: 70
End: 2025-02-04
Payer: MEDICARE

## 2025-02-04 DIAGNOSIS — N52.9 ERECTILE DYSFUNCTION, UNSPECIFIED ERECTILE DYSFUNCTION TYPE: Primary | ICD-10-CM

## 2025-02-04 RX ORDER — SILDENAFIL 100 MG/1
100 TABLET, FILM COATED ORAL DAILY PRN
Qty: 12 TABLET | Refills: 3 | Status: SHIPPED | OUTPATIENT
Start: 2025-02-04 | End: 2026-02-04

## 2025-02-04 NOTE — TELEPHONE ENCOUNTER
Patient requesting a call from you, he would not tell me why when I asked, he said that its 'personal'.

## 2025-02-07 DIAGNOSIS — I10 HYPERTENSION, UNSPECIFIED TYPE: ICD-10-CM

## 2025-02-07 NOTE — TELEPHONE ENCOUNTER
Patient needs refill on Olmesartan/Amlodipine/hydrochlorothiazide 40/10/25mg #90 takes one a day  Pharmacy [facility]

## 2025-02-11 ENCOUNTER — TELEPHONE (OUTPATIENT)
Dept: OTOLARYNGOLOGY | Facility: CLINIC | Age: 70
End: 2025-02-11
Payer: MEDICARE

## 2025-02-11 DIAGNOSIS — J32.9 CHRONIC SINUSITIS, UNSPECIFIED LOCATION: Primary | ICD-10-CM

## 2025-02-11 RX ORDER — PREDNISONE 10 MG/1
TABLET ORAL
Qty: 39 TABLET | Refills: 0 | Status: SHIPPED | OUTPATIENT
Start: 2025-02-11 | End: 2025-02-23

## 2025-02-11 RX ORDER — OLMESARTAN MEDOXOMIL, AMLODIPINE AND HYDROCHLOROTHIAZIDE TABLET 40/10/25 MG 40; 10; 25 MG/1; MG/1; MG/1
1 TABLET ORAL DAILY
Qty: 90 TABLET | Refills: 2 | Status: SHIPPED | OUTPATIENT
Start: 2025-02-11

## 2025-02-11 NOTE — TELEPHONE ENCOUNTER
Patient was last treated in the office on 4/2024 for sinus issues and nasal polyps. He called requesting a prescription of prednisone to [facility] in [place], [place]. He is currently out of town but has an appointment scheduled once he comes back.

## 2025-03-12 ENCOUNTER — TELEPHONE (OUTPATIENT)
Dept: PRIMARY CARE | Facility: CLINIC | Age: 70
End: 2025-03-12
Payer: MEDICARE

## 2025-03-13 DIAGNOSIS — E78.2 MIXED HYPERLIPIDEMIA: Primary | ICD-10-CM

## 2025-03-25 LAB
ALBUMIN SERPL-MCNC: 4.8 G/DL (ref 3.6–5.1)
ALP SERPL-CCNC: 82 U/L (ref 35–144)
ALT SERPL-CCNC: 35 U/L (ref 9–46)
ANION GAP SERPL CALCULATED.4IONS-SCNC: 14 MMOL/L (CALC) (ref 7–17)
AST SERPL-CCNC: 27 U/L (ref 10–35)
BILIRUB SERPL-MCNC: 0.8 MG/DL (ref 0.2–1.2)
BUN SERPL-MCNC: 17 MG/DL (ref 7–25)
CALCIUM SERPL-MCNC: 9.5 MG/DL (ref 8.6–10.3)
CHLORIDE SERPL-SCNC: 101 MMOL/L (ref 98–110)
CHOLEST SERPL-MCNC: 167 MG/DL
CHOLEST/HDLC SERPL: 3.3 (CALC)
CO2 SERPL-SCNC: 26 MMOL/L (ref 20–32)
CREAT SERPL-MCNC: 1.17 MG/DL (ref 0.7–1.35)
EGFRCR SERPLBLD CKD-EPI 2021: 68 ML/MIN/1.73M2
GLUCOSE SERPL-MCNC: 91 MG/DL (ref 65–99)
HDLC SERPL-MCNC: 50 MG/DL
LDLC SERPL CALC-MCNC: 90 MG/DL (CALC)
NONHDLC SERPL-MCNC: 117 MG/DL (CALC)
POTASSIUM SERPL-SCNC: 3.2 MMOL/L (ref 3.5–5.3)
PROT SERPL-MCNC: 7.4 G/DL (ref 6.1–8.1)
SODIUM SERPL-SCNC: 141 MMOL/L (ref 135–146)
TRIGL SERPL-MCNC: 167 MG/DL

## 2025-03-27 ENCOUNTER — TELEPHONE (OUTPATIENT)
Dept: PRIMARY CARE | Facility: CLINIC | Age: 70
End: 2025-03-27
Payer: MEDICARE

## 2025-03-27 NOTE — TELEPHONE ENCOUNTER
Patient calling in regards to his lab work and wants to know if you wanted to change the dosage of his cholesterol medication due to to the results

## 2025-03-31 ENCOUNTER — APPOINTMENT (OUTPATIENT)
Dept: OTOLARYNGOLOGY | Facility: CLINIC | Age: 70
End: 2025-03-31
Payer: MEDICARE

## 2025-03-31 VITALS — TEMPERATURE: 97.2 F | HEIGHT: 67 IN | WEIGHT: 193 LBS | BODY MASS INDEX: 30.29 KG/M2

## 2025-03-31 DIAGNOSIS — J33.9 NASAL POLYPS: ICD-10-CM

## 2025-03-31 DIAGNOSIS — J32.9 CHRONIC SINUSITIS, UNSPECIFIED LOCATION: Primary | ICD-10-CM

## 2025-03-31 PROCEDURE — 1160F RVW MEDS BY RX/DR IN RCRD: CPT | Performed by: OTOLARYNGOLOGY

## 2025-03-31 PROCEDURE — 1036F TOBACCO NON-USER: CPT | Performed by: OTOLARYNGOLOGY

## 2025-03-31 PROCEDURE — 3008F BODY MASS INDEX DOCD: CPT | Performed by: OTOLARYNGOLOGY

## 2025-03-31 PROCEDURE — 99213 OFFICE O/P EST LOW 20 MIN: CPT | Performed by: OTOLARYNGOLOGY

## 2025-03-31 PROCEDURE — 1159F MED LIST DOCD IN RCRD: CPT | Performed by: OTOLARYNGOLOGY

## 2025-03-31 RX ORDER — PREDNISONE 10 MG/1
TABLET ORAL
Qty: 39 TABLET | Refills: 0 | Status: SHIPPED | OUTPATIENT
Start: 2025-03-31 | End: 2025-04-12

## 2025-03-31 NOTE — PROGRESS NOTES
HPI  Lemuel Aleman is a 70 y.o. male about 6-year status post bilateral endoscopic sinus surgery for nasal polyposis.  He was last seen April 2024.  He did well until about December and was not even requiring daily nasal steroid spray.  Around December he developed more nasal drainage and congestion and ultimately called for a course of oral steroids in February which helped for a week or 2 and then his symptoms have recurred and he is now congested with considerable nasal drainage again.  He has tried using nasal sprays now and they are not helping      Prior history: 66-year-old male with history of bilateral endoscopic sinus surgery in mid 2019 for significant nasal polyposis. He has been using topical nasal steroids since that time. He has developed increasing left greater than right nasal obstruction and took 3 days of prednisone few weeks ago with some improvement and then discontinued this course. He has been reasonably stably breathing through his nose bilaterally since then but presents for follow-up to see what more can or should be done. He has not required antibiotics. He does not complain of vision changes or pain       Past Medical History:   Diagnosis Date    Personal history of other diseases of the circulatory system     History of hypertension    Personal history of other diseases of the respiratory system     History of nasal polyp            Medications:     Current Outpatient Medications:     budesonide-formoteroL (Symbicort) 160-4.5 mcg/actuation inhaler, Inhale 2 puffs 2 times a day. Rinse mouth with water after use to reduce aftertaste and incidence of candidiasis. Do not swallow., Disp: , Rfl:     fluticasone (Flonase) 50 mcg/actuation nasal spray, Administer 1 spray into each nostril once daily. Shake gently. Before first use, prime pump. After use, clean tip and replace cap., Disp: , Rfl:     olmesartan-amLODIPin-hcthiazid 40-10-25 mg tablet, Take 1 tablet by mouth once daily., Disp:  "90 tablet, Rfl: 2    rosuvastatin (Crestor) 10 mg tablet, Take 1 tablet (10 mg) by mouth once daily., Disp: 100 tablet, Rfl: 3    sildenafil (Viagra) 100 mg tablet, Take 1 tablet (100 mg) by mouth once daily as needed for erectile dysfunction., Disp: 12 tablet, Rfl: 3    fluticasone (Flonase) 50 mcg/actuation nasal spray, Administer 2 sprays into each nostril once daily. Shake gently. Before first use, prime pump. After use, clean tip and replace cap. (Patient not taking: Reported on 3/31/2025), Disp: 48 g, Rfl: 3     Allergies:  Allergies   Allergen Reactions    Ibuprofen Other and Unknown     CHEST PAIN    Ketorolac Angioedema    Sulfa (Sulfonamide Antibiotics) Unknown        Physical Exam:  Last Recorded Vitals  Temperature 36.2 °C (97.2 °F), height 1.702 m (5' 7\"), weight 87.5 kg (193 lb).  General:     General appearance: Well-developed, well-nourished in no acute distress.       Voice:  normal       Head/face: Normal appearance; nontender to palpation     Facial nerve function: Normal and symmetric bilaterally.    Oral/oropharynx:     Oral vestibule: Normal labial and gingival mucosa     Tongue/floor of mouth: Normal without lesion     Oropharynx: Clear.  No lesions present of the hard/soft palate, posterior pharynx    Neck:     Neck: Normal appearance, trachea midline     Salivary glands: Normal to palpation bilaterally     Lymph nodes: No cervical lymphadenopathy to palpation     Thyroid: No thyromegaly.  No palpable nodules     Range of motion: Normal    Neurological:     Cortical functions: Alert and oriented x3, appropriate affect       Larynx/hypopharynx:     Laryngeal findings: Mirror exam inadequate or limited secondary to enlarged base of tongue and/or excessive gagging    Ear:     Ear canal: Normal bilaterally     Tympanic membrane: Intact and mobile bilaterally     Pinna: Normal bilaterally     Hearing:  Gross hearing assessment normal by voice    Nose:     Visualized using: Anterior rhinoscopy     " Nasopharynx: Inadequate mirror exam secondary to gag, anatomy.       Nasal dorsum: Nontraumatic midline appearance     Septum: Midline     Inferior turbinates: Normally sized     Mucosa: Polyps to the nasal floor bilaterally without purulence    ASSESSMENT/PLAN:  He has had considerable regrowth of his polyps and I have recommended reducing his oral prednisone but encouraged him to use his nasal spray faithfully even when he feels like his symptoms are improved.  I will see him back in about 2 weeks after this course of prednisone to assess his response to treatment.        Mehran Sorensen MD

## 2025-04-21 ENCOUNTER — APPOINTMENT (OUTPATIENT)
Dept: OTOLARYNGOLOGY | Facility: CLINIC | Age: 70
End: 2025-04-21
Payer: MEDICARE

## 2025-04-28 ENCOUNTER — APPOINTMENT (OUTPATIENT)
Dept: OTOLARYNGOLOGY | Facility: CLINIC | Age: 70
End: 2025-04-28
Payer: MEDICARE

## 2025-04-28 VITALS — TEMPERATURE: 97.7 F | BODY MASS INDEX: 29.98 KG/M2 | WEIGHT: 191 LBS | HEIGHT: 67 IN

## 2025-04-28 DIAGNOSIS — J32.9 CHRONIC SINUSITIS, UNSPECIFIED LOCATION: ICD-10-CM

## 2025-04-28 DIAGNOSIS — J33.9 NASAL POLYPS: Primary | ICD-10-CM

## 2025-04-28 PROCEDURE — 1160F RVW MEDS BY RX/DR IN RCRD: CPT | Performed by: OTOLARYNGOLOGY

## 2025-04-28 PROCEDURE — 3008F BODY MASS INDEX DOCD: CPT | Performed by: OTOLARYNGOLOGY

## 2025-04-28 PROCEDURE — 1159F MED LIST DOCD IN RCRD: CPT | Performed by: OTOLARYNGOLOGY

## 2025-04-28 PROCEDURE — 99213 OFFICE O/P EST LOW 20 MIN: CPT | Performed by: OTOLARYNGOLOGY

## 2025-04-28 NOTE — PROGRESS NOTES
HPI  Lemuel Aleman is a 70 y.o. male follow-up recent prednisone and continued topical steroid use.  Unfortunately he did not get much benefit at this time from the prednisone.  He is congested bilaterally.    Prior history: About 6-year status post bilateral endoscopic sinus surgery for nasal polyposis.  He was last seen April 2024.  He did well until about December and was not even requiring daily nasal steroid spray.  Around December he developed more nasal drainage and congestion and ultimately called for a course of oral steroids in February which helped for a week or 2 and then his symptoms have recurred and he is now congested with considerable nasal drainage again.  He has tried using nasal sprays now and they are not helping      Prior history: 66-year-old male with history of bilateral endoscopic sinus surgery in mid 2019 for significant nasal polyposis. He has been using topical nasal steroids since that time. He has developed increasing left greater than right nasal obstruction and took 3 days of prednisone few weeks ago with some improvement and then discontinued this course. He has been reasonably stably breathing through his nose bilaterally since then but presents for follow-up to see what more can or should be done. He has not required antibiotics. He does not complain of vision changes or pain       Past Medical History:   Diagnosis Date    Personal history of other diseases of the circulatory system     History of hypertension    Personal history of other diseases of the respiratory system     History of nasal polyp            Medications:     Current Outpatient Medications:     budesonide-formoteroL (Symbicort) 160-4.5 mcg/actuation inhaler, Inhale 2 puffs 2 times a day. Rinse mouth with water after use to reduce aftertaste and incidence of candidiasis. Do not swallow., Disp: , Rfl:     fluticasone (Flonase) 50 mcg/actuation nasal spray, Administer 1 spray into each nostril once daily. Shake  "gently. Before first use, prime pump. After use, clean tip and replace cap., Disp: , Rfl:     olmesartan-amLODIPin-hcthiazid 40-10-25 mg tablet, Take 1 tablet by mouth once daily., Disp: 90 tablet, Rfl: 2    rosuvastatin (Crestor) 10 mg tablet, Take 1 tablet (10 mg) by mouth once daily., Disp: 100 tablet, Rfl: 3    sildenafil (Viagra) 100 mg tablet, Take 1 tablet (100 mg) by mouth once daily as needed for erectile dysfunction., Disp: 12 tablet, Rfl: 3    fluticasone (Flonase) 50 mcg/actuation nasal spray, Administer 2 sprays into each nostril once daily. Shake gently. Before first use, prime pump. After use, clean tip and replace cap. (Patient not taking: Reported on 11/7/2024), Disp: 48 g, Rfl: 3     Allergies:  Allergies   Allergen Reactions    Ibuprofen Other and Unknown     CHEST PAIN    Ketorolac Angioedema    Sulfa (Sulfonamide Antibiotics) Unknown and Rash        Physical Exam:  Last Recorded Vitals  Temperature 36.5 °C (97.7 °F), height 1.702 m (5' 7\"), weight 86.6 kg (191 lb).  General:     General appearance: Well-developed, well-nourished in no acute distress.       Voice:  normal       Head/face: Normal appearance; nontender to palpation     Facial nerve function: Normal and symmetric bilaterally.    Oral/oropharynx:     Oral vestibule: Normal labial and gingival mucosa     Tongue/floor of mouth: Normal without lesion     Oropharynx: Clear.  No lesions present of the hard/soft palate, posterior pharynx    Neck:     Neck: Normal appearance, trachea midline     Salivary glands: Normal to palpation bilaterally     Lymph nodes: No cervical lymphadenopathy to palpation     Thyroid: No thyromegaly.  No palpable nodules     Range of motion: Normal    Neurological:     Cortical functions: Alert and oriented x3, appropriate affect       Larynx/hypopharynx:     Laryngeal findings: Mirror exam inadequate or limited secondary to enlarged base of tongue and/or excessive gagging    Ear:     Ear canal: Normal " bilaterally     Tympanic membrane: Intact and mobile bilaterally     Pinna: Normal bilaterally     Hearing:  Gross hearing assessment normal by voice    Nose:     Visualized using: Anterior rhinoscopy     Nasopharynx: Inadequate mirror exam secondary to gag, anatomy.       Nasal dorsum: Nontraumatic midline appearance     Septum: Midline     Inferior turbinates: Normally sized     Mucosa: Polyps again to the nasal floor bilaterally without purulence    ASSESSMENT/PLAN:  He has had considerable symptomatic nasal polyposis refractory to our medical treatment.  He continues to have asthma as well.  We discussed his options as far as his nose goes.  We talked about repeating a CT scan and consideration of revision sinus surgery.  We also talked about trying a new medical regimen such as Nucala or Dupixent.  The risks, goals, and alternatives of medication were discussed and he would like to try this approach.  I would like to initiate Nucala treatment for him and we will work on getting this approved.        Mehran Sorensen MD

## 2025-05-19 ENCOUNTER — TELEPHONE (OUTPATIENT)
Dept: OTOLARYNGOLOGY | Facility: CLINIC | Age: 70
End: 2025-05-19
Payer: MEDICARE

## 2025-05-19 DIAGNOSIS — B99.9 INFECTION: Primary | ICD-10-CM

## 2025-05-19 RX ORDER — PREDNISONE 5 MG/1
TABLET ORAL
Qty: 21 TABLET | Refills: 0 | Status: SHIPPED | OUTPATIENT
Start: 2025-05-19

## 2025-05-19 NOTE — TELEPHONE ENCOUNTER
Lemuel called and is going OOT Friday and has a flare up with his polyps. Could Dr Sorensen send in steroids to [facility] please    [phone]

## 2025-06-05 ENCOUNTER — TELEPHONE (OUTPATIENT)
Dept: OTOLARYNGOLOGY | Facility: CLINIC | Age: 70
End: 2025-06-05
Payer: MEDICARE

## 2025-06-05 NOTE — TELEPHONE ENCOUNTER
Message left to call the office back.   Nucala starts to work within 48 hours after the first dose usually.

## 2025-06-05 NOTE — TELEPHONE ENCOUNTER
Pt is staring on nucala next month.  He is asking for a couple hundred prednisone 10 mg pills. He said 21 day supply just is not enough. I told him you are not going to call that in for him, but he said he needs to take three a day to breath .

## 2025-11-11 ENCOUNTER — APPOINTMENT (OUTPATIENT)
Dept: PRIMARY CARE | Facility: CLINIC | Age: 70
End: 2025-11-11
Payer: MEDICARE